# Patient Record
Sex: FEMALE | Race: OTHER | HISPANIC OR LATINO | URBAN - METROPOLITAN AREA
[De-identification: names, ages, dates, MRNs, and addresses within clinical notes are randomized per-mention and may not be internally consistent; named-entity substitution may affect disease eponyms.]

---

## 2018-07-22 ENCOUNTER — EMERGENCY (EMERGENCY)
Facility: HOSPITAL | Age: 23
LOS: 1 days | Discharge: HOME | End: 2018-07-22
Attending: EMERGENCY MEDICINE
Payer: MEDICAID

## 2018-07-22 VITALS
HEART RATE: 79 BPM | TEMPERATURE: 98 F | SYSTOLIC BLOOD PRESSURE: 110 MMHG | DIASTOLIC BLOOD PRESSURE: 72 MMHG | RESPIRATION RATE: 16 BRPM | OXYGEN SATURATION: 97 %

## 2018-07-22 VITALS
TEMPERATURE: 98 F | RESPIRATION RATE: 17 BRPM | SYSTOLIC BLOOD PRESSURE: 108 MMHG | HEART RATE: 78 BPM | DIASTOLIC BLOOD PRESSURE: 69 MMHG | WEIGHT: 123.02 LBS | OXYGEN SATURATION: 98 % | HEIGHT: 63 IN

## 2018-07-22 DIAGNOSIS — O03.9 COMPLETE OR UNSPECIFIED SPONTANEOUS ABORTION WITHOUT COMPLICATION: ICD-10-CM

## 2018-07-22 LAB
ANION GAP SERPL CALC-SCNC: 8 MMOL/L — SIGNIFICANT CHANGE UP (ref 5–17)
APPEARANCE UR: CLEAR — SIGNIFICANT CHANGE UP
BASOPHILS # BLD AUTO: 0.1 K/UL — SIGNIFICANT CHANGE UP (ref 0–0.2)
BASOPHILS NFR BLD AUTO: 0.9 % — SIGNIFICANT CHANGE UP (ref 0–2)
BILIRUB UR-MCNC: NEGATIVE — SIGNIFICANT CHANGE UP
BUN SERPL-MCNC: 12 MG/DL — SIGNIFICANT CHANGE UP (ref 7–18)
CALCIUM SERPL-MCNC: 8.8 MG/DL — SIGNIFICANT CHANGE UP (ref 8.4–10.5)
CHLORIDE SERPL-SCNC: 107 MMOL/L — SIGNIFICANT CHANGE UP (ref 96–108)
CO2 SERPL-SCNC: 26 MMOL/L — SIGNIFICANT CHANGE UP (ref 22–31)
COLOR SPEC: YELLOW — SIGNIFICANT CHANGE UP
CREAT SERPL-MCNC: 0.78 MG/DL — SIGNIFICANT CHANGE UP (ref 0.5–1.3)
DIFF PNL FLD: ABNORMAL
EOSINOPHIL # BLD AUTO: 0.3 K/UL — SIGNIFICANT CHANGE UP (ref 0–0.5)
EOSINOPHIL NFR BLD AUTO: 3.5 % — SIGNIFICANT CHANGE UP (ref 0–6)
GLUCOSE SERPL-MCNC: 103 MG/DL — HIGH (ref 70–99)
GLUCOSE UR QL: NEGATIVE — SIGNIFICANT CHANGE UP
HCG SERPL-ACNC: 2242 MIU/ML — HIGH
HCT VFR BLD CALC: 38.9 % — SIGNIFICANT CHANGE UP (ref 34.5–45)
HGB BLD-MCNC: 12.6 G/DL — SIGNIFICANT CHANGE UP (ref 11.5–15.5)
KETONES UR-MCNC: NEGATIVE — SIGNIFICANT CHANGE UP
LEUKOCYTE ESTERASE UR-ACNC: ABNORMAL
LYMPHOCYTES # BLD AUTO: 3.1 K/UL — SIGNIFICANT CHANGE UP (ref 1–3.3)
LYMPHOCYTES # BLD AUTO: 34.6 % — SIGNIFICANT CHANGE UP (ref 13–44)
MCHC RBC-ENTMCNC: 30.3 PG — SIGNIFICANT CHANGE UP (ref 27–34)
MCHC RBC-ENTMCNC: 32.4 GM/DL — SIGNIFICANT CHANGE UP (ref 32–36)
MCV RBC AUTO: 93.6 FL — SIGNIFICANT CHANGE UP (ref 80–100)
MONOCYTES # BLD AUTO: 0.7 K/UL — SIGNIFICANT CHANGE UP (ref 0–0.9)
MONOCYTES NFR BLD AUTO: 7.8 % — SIGNIFICANT CHANGE UP (ref 2–14)
NEUTROPHILS # BLD AUTO: 4.8 K/UL — SIGNIFICANT CHANGE UP (ref 1.8–7.4)
NEUTROPHILS NFR BLD AUTO: 53.2 % — SIGNIFICANT CHANGE UP (ref 43–77)
NITRITE UR-MCNC: NEGATIVE — SIGNIFICANT CHANGE UP
PH UR: 7 — SIGNIFICANT CHANGE UP (ref 5–8)
PLATELET # BLD AUTO: 259 K/UL — SIGNIFICANT CHANGE UP (ref 150–400)
POTASSIUM SERPL-MCNC: 3.5 MMOL/L — SIGNIFICANT CHANGE UP (ref 3.5–5.3)
POTASSIUM SERPL-SCNC: 3.5 MMOL/L — SIGNIFICANT CHANGE UP (ref 3.5–5.3)
PROT UR-MCNC: 15
RBC # BLD: 4.16 M/UL — SIGNIFICANT CHANGE UP (ref 3.8–5.2)
RBC # FLD: 11.4 % — SIGNIFICANT CHANGE UP (ref 10.3–14.5)
SODIUM SERPL-SCNC: 141 MMOL/L — SIGNIFICANT CHANGE UP (ref 135–145)
SP GR SPEC: 1.01 — SIGNIFICANT CHANGE UP (ref 1.01–1.02)
UROBILINOGEN FLD QL: NEGATIVE — SIGNIFICANT CHANGE UP
WBC # BLD: 9 K/UL — SIGNIFICANT CHANGE UP (ref 3.8–10.5)
WBC # FLD AUTO: 9 K/UL — SIGNIFICANT CHANGE UP (ref 3.8–10.5)

## 2018-07-22 PROCEDURE — 86850 RBC ANTIBODY SCREEN: CPT

## 2018-07-22 PROCEDURE — 85027 COMPLETE CBC AUTOMATED: CPT

## 2018-07-22 PROCEDURE — 81001 URINALYSIS AUTO W/SCOPE: CPT

## 2018-07-22 PROCEDURE — 76817 TRANSVAGINAL US OBSTETRIC: CPT | Mod: 26

## 2018-07-22 PROCEDURE — 86901 BLOOD TYPING SEROLOGIC RH(D): CPT

## 2018-07-22 PROCEDURE — 86900 BLOOD TYPING SEROLOGIC ABO: CPT

## 2018-07-22 PROCEDURE — 76801 OB US < 14 WKS SINGLE FETUS: CPT

## 2018-07-22 PROCEDURE — 76817 TRANSVAGINAL US OBSTETRIC: CPT

## 2018-07-22 PROCEDURE — 76801 OB US < 14 WKS SINGLE FETUS: CPT | Mod: 26

## 2018-07-22 PROCEDURE — 76815 OB US LIMITED FETUS(S): CPT

## 2018-07-22 PROCEDURE — 87086 URINE CULTURE/COLONY COUNT: CPT

## 2018-07-22 PROCEDURE — 76815 OB US LIMITED FETUS(S): CPT | Mod: 26

## 2018-07-22 PROCEDURE — 99284 EMERGENCY DEPT VISIT MOD MDM: CPT | Mod: 25

## 2018-07-22 PROCEDURE — 99284 EMERGENCY DEPT VISIT MOD MDM: CPT

## 2018-07-22 PROCEDURE — 80048 BASIC METABOLIC PNL TOTAL CA: CPT

## 2018-07-22 PROCEDURE — 84702 CHORIONIC GONADOTROPIN TEST: CPT

## 2018-07-22 RX ORDER — ACETAMINOPHEN 500 MG
650 TABLET ORAL ONCE
Qty: 0 | Refills: 0 | Status: COMPLETED | OUTPATIENT
Start: 2018-07-22 | End: 2018-07-22

## 2018-07-22 RX ADMIN — Medication 650 MILLIGRAM(S): at 20:12

## 2018-07-22 NOTE — CONSULT NOTE ADULT - PROBLEM SELECTOR RECOMMENDATION 9
Pt informed of sonogram findings and instructed to follow up as scheduled with ob/gyn on Friday 7/27/18.   May take tylenol or motrin as needed if pain returns.  Return to ED if increased bleeding, pain , dizziness, chest pain or any other acute onset of symptoms.  Given copy of her results to compare hcg with her gyn  d/w Dr. Garcia

## 2018-07-22 NOTE — ED ADULT NURSE NOTE - OBJECTIVE STATEMENT
pt from home c/o of heavy vaginal bleeding since 1pm today pt is alert awake oriented x3 reports lower abdominal pain LMP 5/25

## 2018-07-22 NOTE — ED PROVIDER NOTE - PROGRESS NOTE DETAILS
epps: pt hcg 2200+ with no gestational sac or sign of ectopic.  gyn called and likely a complete ab.  repeat exam by gyn pa no focal pain.    dx complete ab. f/u with GYN has appt.  close return precautions given. pelvic precautions given. left ambulatory.

## 2018-07-22 NOTE — ED PROVIDER NOTE - OBJECTIVE STATEMENT
22 yr old female  at 6-7 wks pregnant based on LMP on prenatal vitamin presents to ed c/o lower abd pain with vaginal bleed since am.  + back pain.  normal conception.  no dysuria.

## 2018-07-22 NOTE — ED PROVIDER NOTE - MEDICAL DECISION MAKING DETAILS
22 yr old female  at 6-7 wks pregnant based on LMP on prenatal vitamin presents to ed c/o lower abd pain with vaginal bleed since am.  + back pain.  normal conception.  no dysuria.    vag bleed in pregnancy r/o ectopic vs missed vs threaten ab.  labs, ua, sono, type and screen

## 2018-07-22 NOTE — ED PROVIDER NOTE - GENITOURINARY, MLM
No discharge, lesions. closed os with blood at fingertips and no cmt or adnexal tenderness chaperone PCA

## 2018-07-22 NOTE — CONSULT NOTE ADULT - SUBJECTIVE AND OBJECTIVE BOX
23yo  LMP 18, EGA by LMP [id number] presents to ED c/o vaginal bleeding since 1pm with cramping. States that at around 3pm, she passed a few clots and since then the bleeding and cramping has subsided.  Denies fever, chills, nausea/vomiting, pelvic or abdominal pain, SOB, palpitations, chest pain, dizziness or any other acute issues.  PMH: denies  PSH: denies  PGyn: states she has never seen a gyn for routine examination, sexually active with one partner, does not always use protection  Meds: PNV  Allergies: NKDA  Social: denies h/o smoking, etoh or drug use    PE: Pt appears comfortable in stretcher, escorted to gyn exam room, ambulating  VSS  Abd: soft, NT; no guarding or rebound; +BS x4; no masses appreciated  Back: no CVAT   Gyn: SSE: small amount of dark red blood noted in vaginal vault, about 5cc; gently cleaned and no active vaginal bleeding observed from os; no discharge. no lesions  SVE: cervix closed    Complete Blood Count + Automated Diff (18 @ 20:06)    WBC Count: 9.0 K/uL    RBC Count: 4.16 M/uL    Hemoglobin: 12.6 g/dL    Hematocrit: 38.9 %    Mean Cell Volume: 93.6 fl    Mean Cell Hemoglobin: 30.3 pg    Mean Cell Hemoglobin Conc: 32.4 gm/dL    Red Cell Distrib Width: 11.4 %    Platelet Count - Automated: 259 K/uL    Auto Neutrophil #: 4.8 K/uL    Auto Lymphocyte #: 3.1 K/uL    Auto Monocyte #: 0.7 K/uL    Auto Eosinophil #: 0.3 K/uL    Auto Basophil #: 0.1 K/uL    Auto Neutrophil %: 53.2: Differential percentages must be correlated with absolute numbers for  clinical significance. %    Auto Lymphocyte %: 34.6 %    Auto Monocyte %: 7.8 %    Auto Eosinophil %: 3.5 %    Auto Basophil %: 0.9 %    HCG Quantitative, Serum (18 @ 20:06)    HCG Quantitative, Serum: 2242    Type + Screen (18 @ 20:12)    ABO RH Interpretation: O POS    US Echo Transvaginal, OB (18 @ 21:47) >  EXAM:  US OB LES THAN 14 WKS 1ST GEST                        EXAM:  US OB TRANSVAGINAL                        PROCEDURE DATE:  2018    INTERPRETATION:  CLINICAL INFORMATION: Pregnant. Vaginal bleeding.   TECHNIQUE: Transabdominal and transvaginal pelvic sonogram was performed   using grayscale, color Doppler, and M-mode functions.  FINDINGS:  The uterus is normal in size, measuring 6.5 x 3.5 x 4 cm.       There is no discernible gestational sac within the endometrial cavity.    The endometrium measures approximately    7 mm.            The cervix appears unremarkable.  The right ovary measures 2.2 x 1.6 x 2.3 cm.  The right ovary is normal in morphology.   The left ovary measures 1.7 x 0.7 x 1.6 cm.  The left ovary is normal in morphology.   Vascular flow is demonstrated to both ovaries.  No suspicious adnexal masses are seen.  There is small volume free fluid adjacent to the right adnexa.

## 2018-07-22 NOTE — ED ADULT NURSE NOTE - NS ED NURSE DC INFO COMPLEXITY
Verbalized Understanding/Simple: Patient demonstrates quick and easy understanding Simple: Patient demonstrates quick and easy understanding/Verbalized Understanding

## 2018-07-24 LAB
CULTURE RESULTS: NO GROWTH — SIGNIFICANT CHANGE UP
SPECIMEN SOURCE: SIGNIFICANT CHANGE UP

## 2019-04-06 ENCOUNTER — EMERGENCY (EMERGENCY)
Facility: HOSPITAL | Age: 24
LOS: 1 days | Discharge: HOME | End: 2019-04-06
Attending: EMERGENCY MEDICINE
Payer: MEDICAID

## 2019-04-06 VITALS
OXYGEN SATURATION: 97 % | RESPIRATION RATE: 20 BRPM | HEART RATE: 75 BPM | TEMPERATURE: 98 F | WEIGHT: 130.07 LBS | SYSTOLIC BLOOD PRESSURE: 109 MMHG | DIASTOLIC BLOOD PRESSURE: 67 MMHG | HEIGHT: 63 IN

## 2019-04-06 PROCEDURE — 99284 EMERGENCY DEPT VISIT MOD MDM: CPT | Mod: 25

## 2019-04-06 PROCEDURE — 73130 X-RAY EXAM OF HAND: CPT | Mod: 26,LT

## 2019-04-06 PROCEDURE — 73130 X-RAY EXAM OF HAND: CPT

## 2019-04-06 PROCEDURE — 99283 EMERGENCY DEPT VISIT LOW MDM: CPT

## 2019-04-06 PROCEDURE — 73110 X-RAY EXAM OF WRIST: CPT

## 2019-04-06 PROCEDURE — 73110 X-RAY EXAM OF WRIST: CPT | Mod: 26,LT

## 2019-04-06 NOTE — ED PROVIDER NOTE - UPPER EXTREMITY EXAM, LEFT
left wrist-dorsal tenderness, non-specific. No snuffbox tenderness, 2+ radial and ulnar pulses. 5/5  strength and extension./TENDERNESS

## 2019-04-06 NOTE — ED PROVIDER NOTE - OBJECTIVE STATEMENT
22 y/o F with no PMHx/PSHx presents to ED c/o worsening pain to left wrist x today. Pt fell on outstretched hand while running yesterday. Initially did not feel pain. Denies swelling. Took Advil prior to arrival. NKDA. 24 y/o F with no PMHx/PSHx presents to ED c/o worsening pain to left wrist x today. Pt fell on outstretched hand while running yesterday. Initially did not feel much pain. Denies swelling. Took Advil prior to arrival. NKDA.

## 2019-04-06 NOTE — ED PROVIDER NOTE - NS_ATTENDINGSCRIBE_ED_ALL_ED
No visual changes.
I personally performed the service described in the documentation recorded by the scribe in my presence, and it accurately and completely records my words and actions.

## 2022-10-28 NOTE — ED PROVIDER NOTE - NSTIMEPROVIDERCAREINITIATE_GEN_ER
Render Risk Assessment In Note?: no Additional Notes: Declined treatment. Detail Level: Simple 06-Apr-2019 14:25
